# Patient Record
Sex: FEMALE | URBAN - METROPOLITAN AREA
[De-identification: names, ages, dates, MRNs, and addresses within clinical notes are randomized per-mention and may not be internally consistent; named-entity substitution may affect disease eponyms.]

---

## 2022-08-08 ENCOUNTER — APPOINTMENT (OUTPATIENT)
Age: 42
Setting detail: DERMATOLOGY
End: 2022-08-08

## 2022-08-08 DIAGNOSIS — L81.4 OTHER MELANIN HYPERPIGMENTATION: ICD-10-CM

## 2022-08-08 DIAGNOSIS — L20.89 OTHER ATOPIC DERMATITIS: ICD-10-CM

## 2022-08-08 PROBLEM — L20.84 INTRINSIC (ALLERGIC) ECZEMA: Status: ACTIVE | Noted: 2022-08-08

## 2022-08-08 PROCEDURE — OTHER MIPS QUALITY: OTHER

## 2022-08-08 PROCEDURE — OTHER ADDITIONAL NOTES: OTHER

## 2022-08-08 PROCEDURE — OTHER COUNSELING: OTHER

## 2022-08-08 PROCEDURE — 99204 OFFICE O/P NEW MOD 45 MIN: CPT

## 2022-08-08 PROCEDURE — OTHER RECOMMENDATIONS: OTHER

## 2022-08-08 PROCEDURE — OTHER PRESCRIPTION: OTHER

## 2022-08-08 RX ORDER — KETOCONAZOLE 20 MG/G
CREAM TOPICAL
Qty: 30 | Refills: 3 | Status: ERX | COMMUNITY
Start: 2022-08-08

## 2022-08-08 RX ORDER — TACROLIMUS 1 MG/G
OINTMENT TOPICAL
Qty: 60 | Refills: 0 | Status: ERX | COMMUNITY
Start: 2022-08-08

## 2022-08-08 ASSESSMENT — LOCATION SIMPLE DESCRIPTION DERM
LOCATION SIMPLE: LEFT CHEEK
LOCATION SIMPLE: RIGHT CHEEK
LOCATION SIMPLE: RIGHT CHEEK
LOCATION SIMPLE: LEFT CHEEK

## 2022-08-08 ASSESSMENT — LOCATION DETAILED DESCRIPTION DERM
LOCATION DETAILED: RIGHT LATERAL BUCCAL CHEEK
LOCATION DETAILED: RIGHT INFERIOR CENTRAL MALAR CHEEK
LOCATION DETAILED: LEFT CENTRAL BUCCAL CHEEK
LOCATION DETAILED: LEFT INFERIOR CENTRAL MALAR CHEEK

## 2022-08-08 ASSESSMENT — SEVERITY ASSESSMENT 2020: SEVERITY 2020: MODERATE

## 2022-08-08 ASSESSMENT — LOCATION ZONE DERM
LOCATION ZONE: FACE
LOCATION ZONE: FACE

## 2022-08-08 ASSESSMENT — BSA RASH: BSA RASH: 3

## 2022-08-08 NOTE — PROCEDURE: ADDITIONAL NOTES
Render Risk Assessment In Note?: no
Detail Level: Simple
Additional Notes: The patient has tried OTC lightening treatments that have flared the eczema on her face. She is interested in chemical peels. I advised the patient that controlling her facial eczema first would yield better results and minimize risk for worsening.

## 2022-08-08 NOTE — PROCEDURE: RECOMMENDATIONS
Render Risk Assessment In Note?: no
Recommendation Preamble: First Aid Beauty Ultra Repair Firming Collagen Cream
Detail Level: Zone

## 2024-11-06 ENCOUNTER — APPOINTMENT (RX ONLY)
Dept: URBAN - METROPOLITAN AREA CLINIC 40 | Facility: CLINIC | Age: 44
Setting detail: DERMATOLOGY
End: 2024-11-06

## 2024-11-06 DIAGNOSIS — L20.89 OTHER ATOPIC DERMATITIS: ICD-10-CM | Status: INADEQUATELY CONTROLLED

## 2024-11-06 PROCEDURE — ? PRESCRIPTION

## 2024-11-06 PROCEDURE — ? DIAGNOSIS COMMENT

## 2024-11-06 PROCEDURE — ? COUNSELING

## 2024-11-06 PROCEDURE — 99204 OFFICE O/P NEW MOD 45 MIN: CPT

## 2024-11-06 PROCEDURE — ? PRESCRIPTION MEDICATION MANAGEMENT

## 2024-11-06 PROCEDURE — ? ADBRY INITIATION

## 2024-11-06 RX ORDER — PIMECROLIMUS 10 MG/G
CREAM TOPICAL
Qty: 60 | Refills: 0 | Status: ERX | COMMUNITY
Start: 2024-11-06

## 2024-11-06 RX ORDER — TRALOKINUMAB-LDRM 300 MG/2ML
INJECTION, SOLUTION SUBCUTANEOUS
Qty: 4 | Refills: 5 | Status: ERX | COMMUNITY
Start: 2024-11-06

## 2024-11-06 RX ORDER — TRALOKINUMAB-LDRM 300 MG/2ML
INJECTION, SOLUTION SUBCUTANEOUS
Qty: 4 | Refills: 0 | Status: ERX

## 2024-11-06 RX ADMIN — PIMECROLIMUS: 10 CREAM TOPICAL at 00:00

## 2024-11-06 RX ADMIN — TRALOKINUMAB-LDRM: 300 INJECTION, SOLUTION SUBCUTANEOUS at 00:00

## 2024-11-06 ASSESSMENT — LOCATION SIMPLE DESCRIPTION DERM
LOCATION SIMPLE: LEFT CHEEK
LOCATION SIMPLE: RIGHT CHEEK

## 2024-11-06 ASSESSMENT — LOCATION DETAILED DESCRIPTION DERM
LOCATION DETAILED: RIGHT INFERIOR CENTRAL MALAR CHEEK
LOCATION DETAILED: LEFT INFERIOR CENTRAL MALAR CHEEK

## 2024-11-06 ASSESSMENT — LOCATION ZONE DERM: LOCATION ZONE: FACE

## 2024-11-06 ASSESSMENT — ITCH NUMERIC RATING SCALE: HOW SEVERE IS YOUR ITCHING?: 6

## 2024-11-06 ASSESSMENT — BSA ECZEMA: % BODY COVERED IN ECZEMA: 5

## 2024-11-06 NOTE — HPI: RASH
How Severe Is Your Rash?: mild
Is This A New Presentation, Or A Follow-Up?: Rash
Additional History: Patient has stopped dupixent, which was prescribed by Paulo Chambers, on Aug 2 due to side effects including joint pain, vision problems, and reports red eyes. Patient cannot recall the last time she physically saw the dermatologist. She reports flaking of the face and occasion red spots on the reds. Patient reports she never used any topicals. At this time, patient has no other medical complaints.

## 2024-11-06 NOTE — PROCEDURE: DIAGNOSIS COMMENT
Detail Level: Zone
Render Risk Assessment In Note?: no
Comment: Patient has tried and failed topical steroids including Triamcinolone, Desonide and Clobetasol. Pt tried Dupixent for 3 months, discontinued due to adverse effects; pt got conjunctivitis with Dupixent. Start Adbry
Comment: Trial of dupixent x 3M and d/c due to conjunctivitis/blepharitis.

## 2024-11-06 NOTE — PROCEDURE: ADBRY INITIATION
Adbry Monitoring Guidelines: There is no laboratory monitoring requirement with Adbry.
Is Soriatane Contraindicated?: No
Diagnosis (Required): Atopic Dermatitis/Eczematous Dermatitis
Pregnancy And Lactation Warning Text: It is unknown if this medication will adversely affect pregnancy or breast feeding.
Detail Level: Zone
Add Pregnancy And Lactation Warning To Medication Counseling?: Yes
Adbry Dosing: 600 mg SC day 0 then 300 mg SC every other week

## 2024-11-06 NOTE — PROCEDURE: PRESCRIPTION MEDICATION MANAGEMENT
Initiate Treatment: :\\n- Pimecrolimus cream to affected areas on face 1-2 times daily
Detail Level: Zone
Render In Strict Bullet Format?: Yes

## 2024-12-02 ENCOUNTER — APPOINTMENT (RX ONLY)
Dept: URBAN - METROPOLITAN AREA CLINIC 40 | Facility: CLINIC | Age: 44
Setting detail: DERMATOLOGY
End: 2024-12-02

## 2024-12-02 DIAGNOSIS — L20.89 OTHER ATOPIC DERMATITIS: ICD-10-CM | Status: INADEQUATELY CONTROLLED

## 2024-12-02 PROCEDURE — 99214 OFFICE O/P EST MOD 30 MIN: CPT

## 2024-12-02 PROCEDURE — ? PRESCRIPTION

## 2024-12-02 PROCEDURE — ? PRESCRIPTION MEDICATION MANAGEMENT

## 2024-12-02 PROCEDURE — ? DIAGNOSIS COMMENT

## 2024-12-02 PROCEDURE — ? COUNSELING

## 2024-12-02 RX ORDER — HYDROCORTISONE 25 MG/G
OINTMENT TOPICAL
Qty: 28.35 | Refills: 1 | Status: ERX | COMMUNITY
Start: 2024-12-02

## 2024-12-02 RX ADMIN — HYDROCORTISONE: 25 OINTMENT TOPICAL at 00:00

## 2024-12-02 ASSESSMENT — LOCATION SIMPLE DESCRIPTION DERM
LOCATION SIMPLE: RIGHT CHEEK
LOCATION SIMPLE: LEFT CHEEK

## 2024-12-02 ASSESSMENT — BSA ECZEMA: % BODY COVERED IN ECZEMA: 4

## 2024-12-02 ASSESSMENT — LOCATION DETAILED DESCRIPTION DERM
LOCATION DETAILED: LEFT INFERIOR CENTRAL MALAR CHEEK
LOCATION DETAILED: RIGHT INFERIOR CENTRAL MALAR CHEEK

## 2024-12-02 ASSESSMENT — LOCATION ZONE DERM: LOCATION ZONE: FACE

## 2024-12-02 ASSESSMENT — SEVERITY ASSESSMENT 2020: SEVERITY 2020: MODERATE

## 2024-12-02 NOTE — PROCEDURE: DIAGNOSIS COMMENT
Render Risk Assessment In Note?: no
Detail Level: Zone
Comment: Patient d/c Adbry after 2 injections because of increased dryness on her face.  Patient d/c Pimecrolimus due to redness.  Tried and failed Aquaphor, Vaseline and Vanicream.  Patient originally followed by Paulo Chambers but f/u with [facility] due to lead time.  Recommend Betina for 3M and f/u with Paulo Chambers.  Patient is unable to provide history of her medications.

## 2024-12-02 NOTE — PROCEDURE: PRESCRIPTION MEDICATION MANAGEMENT
Modify Regimen: :\\n- Adbry 300 mg SC every other week (pt considering whether to continue)
Initiate Treatment: :\\n- hydrocortisone 2.5 % topical ointment \\nApply then layer to affected area on face twice daily for 7 days
Discontinue Regimen: :\\n- Pimecrolimus cream
Detail Level: Zone
Render In Strict Bullet Format?: Yes